# Patient Record
(demographics unavailable — no encounter records)

---

## 2025-01-06 NOTE — CONSULT LETTER
[Dear  ___] : Dear  [unfilled], [Consult Letter:] : I had the pleasure of evaluating your patient, [unfilled]. [Please see my note below.] : Please see my note below. [Consult Closing:] : Thank you very much for allowing me to participate in the care of this patient.  If you have any questions, please do not hesitate to contact me. [Sincerely,] : Sincerely, [FreeTextEntry3] : Anthony Singh MD, FCCP, D. [facility] [place]

## 2025-01-06 NOTE — REVIEW OF SYSTEMS
[Fatigue] : fatigue [Nasal Congestion] : nasal congestion [Sinus Problems] : sinus problems [SOB on Exertion] : sob on exertion [Seasonal Allergies] : seasonal allergies [GERD] : gerd [Back Pain] : back pain [Anemia] : anemia [History of Iron Deficiency] : history of iron deficiency [Headache] : headache [Anxiety] : anxiety [Diabetes] : diabetes [Obesity] : obesity [Negative] : Cardiovascular [TextBox_94] : h/o fibromyalgia

## 2025-01-06 NOTE — END OF VISIT
[Time Spent: ___ minutes] : I have spent [unfilled] minutes of time on the encounter which excludes teaching and separately reported services. [TextEntry] : Discussed with pt at length regarding JUAN, obesity, soboe, smoking hx, prior Covid infection; reviewed w/u with pt as above.

## 2025-01-06 NOTE — HISTORY OF PRESENT ILLNESS
[Initial Evaluation - Existing Diagnosis] : an initial evaluation of an existing diagnosis of [Excessive Daytime Sleepiness] : excessive daytime sleepiness [Snoring] : snoring [Unrefreshing Sleep] : unrefreshing sleep [Sleepy When Sedentary] : sleepy when sedentary [Morning Headaches] : morning headaches [Initial Eval - Existing Diagnosis] : an initial evaluation of an existing diagnosis of [Excess Weight] : excess weight [Currently Experiencing] : The patient is currently experiencing symptoms. [Dyspnea] : dyspnea [Fatigue] : fatigue [Back Pain] : back pain [Low Calorie Diet] : low calorie diet [Fair Compliance] : fair compliance with treatment [Fair Tolerance] : fair tolerance of treatment [Poor Symptom Control] : poor symptom control [Sleep Apnea] : sleep apnea [Diabetes] : diabetes [High] : high [Low Calorie] : low calorie [Well Balanced Diet] : well balanced meals [None] : The patient does not exercise [TextBox_4] : Current smoker 1/2 ppd x 34 years (since age 12). S/p Covid infections in the past with SOB and cough but did not require hospitalization. Patient c/o SOBOE but is otherwise without associated respiratory complaints.  Pt is trying to lose weight and has lost 50 lbs. Pt reports that she was initially dx'd with JUAN about 15 years ago and could not tolerate PAP therapy at that time. [Witnessed Apnea During Sleep] : no witnessed apnea during sleep [Witnessed Gasping During Sleep] : no witnessed gasping during sleep

## 2025-01-06 NOTE — RESULTS/DATA
[TextEntry] : Home PSG from 10/11/25 revealed moderate JUAN with an AHI of 16.1 but slightly worse in REM sleep and supine position.

## 2025-01-06 NOTE — REASON FOR VISIT
[Initial] : an initial visit [Sleep Apnea] : sleep apnea [Shortness of Breath] : shortness of breath [Obesity] : obesity [Nicotine Dependence] : nicotine dependence [TextBox_44] : prior Covid infection

## 2025-01-06 NOTE — DISCUSSION/SUMMARY
[FreeTextEntry1] :  #1. Will schedule lung function testing in near future to assess lung function. #2. The patient does not appear to require chronic BD therapy at this time. #3. Diet and exercise for weight loss. #4. SOBOE is likely at least somewhat related to weight or deconditioning. #5. CXR to evaluate SOBOE. #6. Start autoCPAP to treat moderate JUAN with an AHI of 16.1; encouraged at least 70% compliance. #7. Replace PAP equipment as needed; ordered 1/6/25. #8. S/p Covid vaccines and boosters. S/p Covid infections. #9. Stressed smoking cessation. #10. F/u in 8 weeks with compliance, PFTs and CXR.   The patient expressed understanding and agreement with the above recommendations/plan and accepts responsibility to be compliant with recommended testing, therapies, and f/u visits. All relevant questions and concerns were addressed.